# Patient Record
Sex: MALE | Race: BLACK OR AFRICAN AMERICAN | NOT HISPANIC OR LATINO
[De-identification: names, ages, dates, MRNs, and addresses within clinical notes are randomized per-mention and may not be internally consistent; named-entity substitution may affect disease eponyms.]

---

## 2019-05-31 ENCOUNTER — APPOINTMENT (OUTPATIENT)
Dept: PULMONOLOGY | Facility: CLINIC | Age: 69
End: 2019-05-31
Payer: MEDICARE

## 2019-05-31 VITALS
BODY MASS INDEX: 30.66 KG/M2 | HEIGHT: 69 IN | HEART RATE: 61 BPM | TEMPERATURE: 98 F | WEIGHT: 207 LBS | DIASTOLIC BLOOD PRESSURE: 103 MMHG | SYSTOLIC BLOOD PRESSURE: 174 MMHG | OXYGEN SATURATION: 95 %

## 2019-05-31 DIAGNOSIS — I10 ESSENTIAL (PRIMARY) HYPERTENSION: ICD-10-CM

## 2019-05-31 DIAGNOSIS — Z86.711 PERSONAL HISTORY OF PULMONARY EMBOLISM: ICD-10-CM

## 2019-05-31 DIAGNOSIS — G45.9 TRANSIENT CEREBRAL ISCHEMIC ATTACK, UNSPECIFIED: ICD-10-CM

## 2019-05-31 PROCEDURE — 99204 OFFICE O/P NEW MOD 45 MIN: CPT

## 2019-05-31 NOTE — PHYSICAL EXAM
[General Appearance - Well Developed] : well developed [Normal Appearance] : normal appearance [Well Groomed] : well groomed [General Appearance - Well Nourished] : well nourished [No Deformities] : no deformities [General Appearance - In No Acute Distress] : no acute distress [Normal Conjunctiva] : the conjunctiva exhibited no abnormalities [Eyelids - No Xanthelasma] : the eyelids demonstrated no xanthelasmas [Low Lying Soft Palate] : low lying soft palate [III] : III [Heart Rate And Rhythm] : heart rate was normal and rhythm regular [Heart Sounds] : normal S1 and S2 [Heart Sounds Gallop] : no gallops [Murmurs] : no murmurs [Heart Sounds Pericardial Friction Rub] : no pericardial rub [Auscultation Breath Sounds / Voice Sounds] : lungs were clear to auscultation bilaterally [Abnormal Walk] : normal gait [Musculoskeletal - Swelling] : no joint swelling seen [Motor Tone] : muscle strength and tone were normal [Nail Clubbing] : no clubbing of the fingernails [Cyanosis, Localized] : no localized cyanosis [Petechial Hemorrhages (___cm)] : no petechial hemorrhages [] : no ischemic changes [FreeTextEntry2] : 2+ R LE edema [Oriented To Time, Place, And Person] : oriented to person, place, and time [Impaired Insight] : insight and judgment were intact [Affect] : the affect was normal [FreeTextEntry1] : cooperative, memory poor

## 2019-05-31 NOTE — ASSESSMENT
[FreeTextEntry1] : snoring, witnessed apnea, hx TIAs and stroke, memory impairment\par \par Discussed w pt and wife- obstructive sleep apnea very likely, and treatment may improve cognitive function and reduce stroke and cardiovascular risks.\par \par Treatment options for sleep disordered breathing were discussed.  The most rapid and successful treatment remains nasal CPAP or BilevelPAP.  Alternatives include upper airway surgery such as uvulopharyngoplasty or a dental appliance (better for milder cases).  Recently hypoglossal nerve stimulation has been used.  Positional therapy (avoidance of supine posture) can be helpful, and all patients should try to maintain a healthy weight and avoid alcohol or other sedating medications close to bedtime \par \par Because of a high likelihood of obstructive sleep apnea, split-night polysomnography will be ordered. If the first few hours of the diagnostic recording confirm the clinical suspicion of severe sleep apnea, CPAP treatment  will be started on the remainder of the night and titrated to an optimal pressure. If successful, CPAP will be ordered after the study.  If severe sleep apnea is not seen, the study will continue as a diagnostic study.

## 2019-05-31 NOTE — HISTORY OF PRESENT ILLNESS
[FreeTextEntry1] : 05/31/2019 :  STACY KIRKPATRICK is a 68 year male who is here for possible obstructive sleep apnea.  He is having increasing problems with memory difficulties, according to his wife, who also reports long hx of severe snoring, witnessed apnea and daytime sleepiness.  He has AM headache most days.  He has gained 20 lbs past 2 yrs. \par \par Also has hx of TIAs and told he has had "mini strokes".  Also past hx of R LE DVT and pulmonary embolism.

## 2019-05-31 NOTE — REVIEW OF SYSTEMS
[Snoring] : snoring [Witnessed Apneas] : witnessed apnea [A.M. Dry Mouth] : a.m. dry mouth [A.M. Headache] : headache present upon awakening [Negative] : Psychiatric

## 2019-06-05 ENCOUNTER — OTHER (OUTPATIENT)
Age: 69
End: 2019-06-05

## 2019-06-19 ENCOUNTER — OTHER (OUTPATIENT)
Age: 69
End: 2019-06-19

## 2019-06-21 ENCOUNTER — OUTPATIENT (OUTPATIENT)
Dept: OUTPATIENT SERVICES | Facility: HOSPITAL | Age: 69
LOS: 1 days | End: 2019-06-21
Payer: COMMERCIAL

## 2019-06-21 ENCOUNTER — APPOINTMENT (OUTPATIENT)
Dept: SLEEP CENTER | Facility: HOSPITAL | Age: 69
End: 2019-06-21

## 2019-06-21 DIAGNOSIS — G47.33 OBSTRUCTIVE SLEEP APNEA (ADULT) (PEDIATRIC): ICD-10-CM

## 2019-06-21 PROCEDURE — 95810 POLYSOM 6/> YRS 4/> PARAM: CPT

## 2019-06-21 PROCEDURE — 95810 POLYSOM 6/> YRS 4/> PARAM: CPT | Mod: 26

## 2019-06-27 ENCOUNTER — OTHER (OUTPATIENT)
Age: 69
End: 2019-06-27

## 2019-06-27 ENCOUNTER — APPOINTMENT (OUTPATIENT)
Dept: PULMONOLOGY | Facility: CLINIC | Age: 69
End: 2019-06-27
Payer: MEDICARE

## 2019-06-27 VITALS
HEIGHT: 69 IN | WEIGHT: 207 LBS | BODY MASS INDEX: 30.66 KG/M2 | SYSTOLIC BLOOD PRESSURE: 183 MMHG | OXYGEN SATURATION: 95 % | HEART RATE: 72 BPM | TEMPERATURE: 97.7 F | DIASTOLIC BLOOD PRESSURE: 126 MMHG

## 2019-06-27 PROCEDURE — 99214 OFFICE O/P EST MOD 30 MIN: CPT

## 2019-06-27 RX ORDER — RIVAROXABAN 20 MG/1
20 TABLET, FILM COATED ORAL
Refills: 0 | Status: ACTIVE | COMMUNITY

## 2019-06-27 NOTE — PHYSICAL EXAM
[Normal Appearance] : normal appearance [General Appearance - Well Developed] : well developed [General Appearance - Well Nourished] : well nourished [Well Groomed] : well groomed [No Deformities] : no deformities [General Appearance - In No Acute Distress] : no acute distress [Low Lying Soft Palate] : low lying soft palate [III] : III [FreeTextEntry1] : awake, alert, cooperative, insight and memory poor

## 2019-06-27 NOTE — HISTORY OF PRESENT ILLNESS
[FreeTextEntry1] : 05/31/2019 :  STACY KIRKPATRICK is a 68 year male who is here for possible obstructive sleep apnea.  He is having increasing problems with memory difficulties, according to his wife, who also reports long hx of severe snoring, witnessed apnea and daytime sleepiness.  He has AM headache most days.  He has gained 20 lbs past 2 yrs. \par \par Also has hx of TIAs and told he has had "mini strokes".  Also past hx of R LE DVT and pulmonary embolism. \par \par 6/27/19:  here after overnight polysomnography 6/21/19.  Reviewed w pt and wife.  Moderate obstructive sleep apnea with Apnea Hypopnea Index 26.  There have been no significant medical events and no new medications since the patient was last seen.

## 2019-06-27 NOTE — DISCUSSION/SUMMARY
[FreeTextEntry1] : obstructive sleep apnea, moderate\par \par Treatment options for sleep disordered breathing were discussed.  The most rapid and successful treatment remains nasal CPAP or BilevelPAP.  Alternatives include upper airway surgery such as uvulopharyngoplasty or a dental appliance (better for milder cases).  Recently hypoglossal nerve stimulation has been used.  Positional therapy (avoidance of supine posture) can be helpful, and all patients should try to maintain a healthy weight and avoid alcohol or other sedating medications close to bedtime \par \par Patient will have overnight polysomnography with CPAP/Bilevel PAP titration.  If successful and comfortable, treatment will be ordered from a durable medical equipment provider shortly after the study.  Follow up about one month after  testing.

## 2019-07-02 ENCOUNTER — OTHER (OUTPATIENT)
Age: 69
End: 2019-07-02

## 2019-07-04 ENCOUNTER — FORM ENCOUNTER (OUTPATIENT)
Age: 69
End: 2019-07-04

## 2019-07-18 ENCOUNTER — OTHER (OUTPATIENT)
Age: 69
End: 2019-07-18

## 2019-07-25 ENCOUNTER — OUTPATIENT (OUTPATIENT)
Dept: OUTPATIENT SERVICES | Facility: HOSPITAL | Age: 69
LOS: 1 days | End: 2019-07-25
Payer: MEDICARE

## 2019-07-25 ENCOUNTER — APPOINTMENT (OUTPATIENT)
Dept: SLEEP CENTER | Facility: HOSPITAL | Age: 69
End: 2019-07-25

## 2019-07-25 DIAGNOSIS — G47.33 OBSTRUCTIVE SLEEP APNEA (ADULT) (PEDIATRIC): ICD-10-CM

## 2019-07-25 PROCEDURE — 95811 POLYSOM 6/>YRS CPAP 4/> PARM: CPT | Mod: 26

## 2019-07-25 PROCEDURE — 95811 POLYSOM 6/>YRS CPAP 4/> PARM: CPT

## 2019-07-30 ENCOUNTER — OTHER (OUTPATIENT)
Age: 69
End: 2019-07-30

## 2019-08-08 ENCOUNTER — APPOINTMENT (OUTPATIENT)
Dept: PULMONOLOGY | Facility: CLINIC | Age: 69
End: 2019-08-08
Payer: MEDICARE

## 2019-08-08 VITALS
TEMPERATURE: 98.9 F | OXYGEN SATURATION: 95 % | HEIGHT: 69 IN | SYSTOLIC BLOOD PRESSURE: 117 MMHG | HEART RATE: 64 BPM | DIASTOLIC BLOOD PRESSURE: 73 MMHG

## 2019-08-08 PROCEDURE — 99213 OFFICE O/P EST LOW 20 MIN: CPT

## 2019-09-19 ENCOUNTER — APPOINTMENT (OUTPATIENT)
Dept: PULMONOLOGY | Facility: CLINIC | Age: 69
End: 2019-09-19
Payer: MEDICARE

## 2019-09-19 VITALS
TEMPERATURE: 98.4 F | HEART RATE: 69 BPM | SYSTOLIC BLOOD PRESSURE: 167 MMHG | OXYGEN SATURATION: 94 % | DIASTOLIC BLOOD PRESSURE: 94 MMHG

## 2019-09-19 PROCEDURE — 99214 OFFICE O/P EST MOD 30 MIN: CPT

## 2019-09-20 NOTE — HISTORY OF PRESENT ILLNESS
[FreeTextEntry1] : 05/31/2019 :  STACY KIRKPATRICK is a 68 year male who is here for possible obstructive sleep apnea.  He is having increasing problems with memory difficulties, according to his wife, who also reports long hx of severe snoring, witnessed apnea and daytime sleepiness.  He has AM headache most days.  He has gained 20 lbs past 2 yrs. \par \par Also has hx of TIAs and told he has had "mini strokes".  Also past hx of R LE DVT and pulmonary embolism. \par \par 6/27/19:  here after overnight polysomnography 6/21/19.  Reviewed w pt and wife.  Moderate obstructive sleep apnea with Apnea Hypopnea Index 26.  There have been no significant medical events and no new medications since the patient was last seen. \par \par 8/8/19: CPAP titration done 7/25/19 reviewed w pt and wife. Slept better than usual. CPAP at 5 eliminated all events.  Ordered, not contacted by durable medical equipment provider yet ([employer]).  There have been no significant medical events and no new medications since the patient was last seen. \par \par 9/19/19:  on cpap about 3 weeks- fewer nocturnal wakes, more alert.  Bed 10, up 630 am, 2-3 wakes (chiara).  No download from durable medical equipment provider yet ([employer])

## 2019-09-20 NOTE — ASSESSMENT
[FreeTextEntry1] : Subjectively better with CPAP, need to get download data.  Ask durable medical equipment provider to provide.  F/U few months.  The patient is advised that in addition to worsening sleep leading to daytime sleepiness, sleep apnea may be associated with worsening hypertension and may be a risk factor for cardiovascular disease and stroke.

## 2019-09-20 NOTE — PHYSICAL EXAM
[General Appearance - Well Developed] : well developed [Normal Appearance] : normal appearance [Well Groomed] : well groomed [General Appearance - Well Nourished] : well nourished [No Deformities] : no deformities [Low Lying Soft Palate] : low lying soft palate [General Appearance - In No Acute Distress] : no acute distress [Heart Rate And Rhythm] : heart rate was normal and rhythm regular [Heart Sounds] : normal S1 and S2 [III] : III [Heart Sounds Gallop] : no gallops [Heart Sounds Pericardial Friction Rub] : no pericardial rub [Murmurs] : no murmurs [Auscultation Breath Sounds / Voice Sounds] : lungs were clear to auscultation bilaterally [Nail Clubbing] : no clubbing of the fingernails [Cyanosis, Localized] : no localized cyanosis [] : no ischemic changes [Petechial Hemorrhages (___cm)] : no petechial hemorrhages [FreeTextEntry2] : 2+ R LE edema

## 2019-09-20 NOTE — PHYSICAL EXAM
[Abnormal Walk] : normal gait [Musculoskeletal - Swelling] : no joint swelling seen [Motor Tone] : muscle strength and tone were normal [General Appearance - Well Developed] : well developed [Normal Appearance] : normal appearance [Well Groomed] : well groomed [General Appearance - Well Nourished] : well nourished [No Deformities] : no deformities [General Appearance - In No Acute Distress] : no acute distress [Low Lying Soft Palate] : low lying soft palate [III] : III [Heart Rate And Rhythm] : heart rate was normal and rhythm regular [Heart Sounds] : normal S1 and S2 [Heart Sounds Gallop] : no gallops [Murmurs] : no murmurs [Heart Sounds Pericardial Friction Rub] : no pericardial rub [Auscultation Breath Sounds / Voice Sounds] : lungs were clear to auscultation bilaterally [Cyanosis, Localized] : no localized cyanosis [Nail Clubbing] : no clubbing of the fingernails [Petechial Hemorrhages (___cm)] : no petechial hemorrhages [] : no ischemic changes [FreeTextEntry2] : 2+ R LE edema

## 2019-09-20 NOTE — ASSESSMENT
[FreeTextEntry1] : Subjectively better on CPAP.  Given long term advice about CPAP use.  Call durable medical equipment provider provider if any equipment problems.  Replace interface as per schedule, generally at least every 3 months.  Stressed importance of CPAP compliance.  Return to see me in about 6 months if doing well. Need to get download from durable medical equipment provider ([employer])

## 2019-09-20 NOTE — HISTORY OF PRESENT ILLNESS
[FreeTextEntry1] : 05/31/2019 :  STACY KIRKPATRICK is a 68 year male who is here for possible obstructive sleep apnea.  He is having increasing problems with memory difficulties, according to his wife, who also reports long hx of severe snoring, witnessed apnea and daytime sleepiness.  He has AM headache most days.  He has gained 20 lbs past 2 yrs. \par \par Also has hx of TIAs and told he has had "mini strokes".  Also past hx of R LE DVT and pulmonary embolism. \par \par 6/27/19:  here after overnight polysomnography 6/21/19.  Reviewed w pt and wife.  Moderate obstructive sleep apnea with Apnea Hypopnea Index 26.  There have been no significant medical events and no new medications since the patient was last seen. \par \par 8/8/19: CPAP titration done 7/25/19 reviewed w pt and wife. Slept better than usual. CPAP at 5 eliminated all events.  Ordered, not contacted by durable medical equipment provider yet ([employer]).  There have been no significant medical events and no new medications since the patient was last seen. \par \par 9/19/19:  on cpap about 3 weeks- fewer nocturnal wakes, more alert.  Bed 10, up 630 am, 2-3 wakes (chiara).  No download from durable medical equipment provider yet ([employer])

## 2019-12-20 ENCOUNTER — APPOINTMENT (OUTPATIENT)
Dept: UROLOGY | Facility: CLINIC | Age: 69
End: 2019-12-20

## 2020-01-09 ENCOUNTER — APPOINTMENT (OUTPATIENT)
Dept: PULMONOLOGY | Facility: CLINIC | Age: 70
End: 2020-01-09
Payer: MEDICARE

## 2020-01-09 VITALS
DIASTOLIC BLOOD PRESSURE: 79 MMHG | BODY MASS INDEX: 29.47 KG/M2 | TEMPERATURE: 98.6 F | HEIGHT: 69 IN | HEART RATE: 63 BPM | SYSTOLIC BLOOD PRESSURE: 149 MMHG | OXYGEN SATURATION: 95 % | WEIGHT: 199 LBS

## 2020-01-09 PROCEDURE — 99214 OFFICE O/P EST MOD 30 MIN: CPT

## 2020-01-09 NOTE — PHYSICAL EXAM
[General Appearance - Well Developed] : well developed [Normal Appearance] : normal appearance [Well Groomed] : well groomed [General Appearance - Well Nourished] : well nourished [No Deformities] : no deformities [General Appearance - In No Acute Distress] : no acute distress [Low Lying Soft Palate] : low lying soft palate [III] : III [Respiration, Rhythm And Depth] : normal respiratory rhythm and effort [Exaggerated Use Of Accessory Muscles For Inspiration] : no accessory muscle use [Auscultation Breath Sounds / Voice Sounds] : lungs were clear to auscultation bilaterally [Abnormal Walk] : normal gait [Gait - Sufficient For Exercise Testing] : the gait was sufficient for exercise testing [Petechial Hemorrhages (___cm)] : no petechial hemorrhages [Cyanosis, Localized] : no localized cyanosis [Nail Clubbing] : no clubbing of the fingernails [Affect] : the affect was normal [Mood] : the mood was normal [] : no ischemic changes [FreeTextEntry1] : cooperative, memory poor

## 2020-01-09 NOTE — DISCUSSION/SUMMARY
[FreeTextEntry1] : obstructive sleep apnea on CPAP\par memory difficulty\par \par Questionable compliance on CPAP - need to get download and new mask.  Showed him Dreamwear under nose mask- this he could tolerate this.\par \par The patient is advised that in addition to worsening sleep leading to daytime sleepiness, sleep apnea may be associated with worsening hypertension and may be a risk factor for cardiovascular disease and stroke. \par \par F/U 4 months

## 2020-01-09 NOTE — HISTORY OF PRESENT ILLNESS
[FreeTextEntry1] : 05/31/2019 :  STACY KIRKPATRICK is a 68 year male who is here for possible obstructive sleep apnea.  He is having increasing problems with memory difficulties, according to his wife, who also reports long hx of severe snoring, witnessed apnea and daytime sleepiness.  He has AM headache most days.  He has gained 20 lbs past 2 yrs. \par \par Also has hx of TIAs and told he has had "mini strokes".  Also past hx of R LE DVT and pulmonary embolism. \par \par 6/27/19:  here after overnight polysomnography 6/21/19.  Reviewed w pt and wife.  Moderate obstructive sleep apnea with Apnea Hypopnea Index 26.  There have been no significant medical events and no new medications since the patient was last seen. \par \par 8/8/19: CPAP titration done 7/25/19 reviewed w pt and wife. Slept better than usual. CPAP at 5 eliminated all events.  Ordered, not contacted by durable medical equipment provider yet ([employer]).  There have been no significant medical events and no new medications since the patient was last seen. \par \par 9/19/19:  on cpap about 3 weeks- fewer nocturnal wakes, more alert.  Bed 10, up 630 am, 2-3 wakes (better).  No download from durable medical equipment provider yet ([employer])\par \par 1/9/20:  No download available- his wife states using about 50% of nights, sleep fragmented, up to urinate 5x/nt.  Memory problems probably worse. Uncomfortable with his mask.\par \par There have been no significant medical events and no new medications since the patient was last seen.

## 2020-01-24 ENCOUNTER — TRANSCRIPTION ENCOUNTER (OUTPATIENT)
Age: 70
End: 2020-01-24

## 2020-04-09 ENCOUNTER — APPOINTMENT (OUTPATIENT)
Dept: PULMONOLOGY | Facility: CLINIC | Age: 70
End: 2020-04-09

## 2020-07-22 ENCOUNTER — TRANSCRIPTION ENCOUNTER (OUTPATIENT)
Age: 70
End: 2020-07-22

## 2020-10-29 ENCOUNTER — APPOINTMENT (OUTPATIENT)
Dept: PULMONOLOGY | Facility: CLINIC | Age: 70
End: 2020-10-29
Payer: MEDICARE

## 2020-10-29 VITALS
WEIGHT: 181 LBS | HEIGHT: 69 IN | OXYGEN SATURATION: 98 % | HEART RATE: 72 BPM | BODY MASS INDEX: 26.81 KG/M2 | TEMPERATURE: 98.6 F | DIASTOLIC BLOOD PRESSURE: 93 MMHG | SYSTOLIC BLOOD PRESSURE: 172 MMHG

## 2020-10-29 DIAGNOSIS — G47.33 OBSTRUCTIVE SLEEP APNEA (ADULT) (PEDIATRIC): ICD-10-CM

## 2020-10-29 DIAGNOSIS — R53.82 CHRONIC FATIGUE, UNSPECIFIED: ICD-10-CM

## 2020-10-29 PROCEDURE — 99214 OFFICE O/P EST MOD 30 MIN: CPT | Mod: 25

## 2020-10-29 PROCEDURE — 99072 ADDL SUPL MATRL&STAF TM PHE: CPT

## 2020-10-29 NOTE — ASSESSMENT
[FreeTextEntry1] : obstructive sleep apnea may have improved/resolved w substantial weight loss.  CPAP was effective but uncomfortable.  Sleep hygiene poor, long time in bed, naps during day as well. Will reassess ANDREA with unattended home sleep testing before deciding on resuming rx with CPAP.  Discussed with patient and wife\par \par

## 2020-10-29 NOTE — HISTORY OF PRESENT ILLNESS
[FreeTextEntry1] : 05/31/2019 :  STACY KIRKPATRICK is a 68 year male who is here for possible obstructive sleep apnea.  He is having increasing problems with memory difficulties, according to his wife, who also reports long hx of severe snoring, witnessed apnea and daytime sleepiness.  He has AM headache most days.  He has gained 20 lbs past 2 yrs. \par \par Also has hx of TIAs and told he has had "mini strokes".  Also past hx of R LE DVT and pulmonary embolism. \par \par 6/27/19:  here after overnight polysomnography 6/21/19.  Reviewed w pt and wife.  Moderate obstructive sleep apnea with Apnea Hypopnea Index 26.  There have been no significant medical events and no new medications since the patient was last seen. \par \par 8/8/19: CPAP titration done 7/25/19 reviewed w pt and wife. Slept better than usual. CPAP at 5 eliminated all events.  Ordered, not contacted by durable medical equipment provider yet ([email or web address]).  There have been no significant medical events and no new medications since the patient was last seen. \par \par 9/19/19:  on cpap about 3 weeks- fewer nocturnal wakes, more alert.  Bed 10, up 630 am, 2-3 wakes (better).  No download from durable medical equipment provider yet ([email or web address])\par \par 1/9/20:  No download available- his wife states using about 50% of nights, sleep fragmented, up to urinate 5x/nt.  Memory problems probably worse. Uncomfortable with his mask.\par \par 10/29/2020: Here w wife.  Unable to get new mask after last visit.  Continuing to lose weight; memory problems about same.  Downloaded CPAP today: last usage in January, Apnea Hypopnea Index 1.6 on rx, on CPAP @ 5.  Has now lost about 26 lbs since sleep apnea dx.  Off CPAP wife reports mild snoring. Bed at 8-9 pm, TV in bed, out of bed 6-8 am after many awakenings.  Sleepy during day.

## 2020-10-29 NOTE — PHYSICAL EXAM
[General Appearance - Well Developed] : well developed [Normal Appearance] : normal appearance [Well Groomed] : well groomed [General Appearance - Well Nourished] : well nourished [No Deformities] : no deformities [General Appearance - In No Acute Distress] : no acute distress [Low Lying Soft Palate] : low lying soft palate [III] : III [Heart Rate And Rhythm] : heart rate was normal and rhythm regular [Heart Sounds] : normal S1 and S2 [Heart Sounds Gallop] : no gallops [Murmurs] : no murmurs [Heart Sounds Pericardial Friction Rub] : no pericardial rub [Auscultation Breath Sounds / Voice Sounds] : lungs were clear to auscultation bilaterally [Abnormal Walk] : normal gait [Musculoskeletal - Swelling] : no joint swelling seen [Motor Tone] : muscle strength and tone were normal [Nail Clubbing] : no clubbing of the fingernails [Cyanosis, Localized] : no localized cyanosis [Petechial Hemorrhages (___cm)] : no petechial hemorrhages [] : no ischemic changes

## 2020-11-05 ENCOUNTER — APPOINTMENT (OUTPATIENT)
Dept: SLEEP CENTER | Facility: HOME HEALTH | Age: 70
End: 2020-11-05

## 2021-08-03 ENCOUNTER — APPOINTMENT (OUTPATIENT)
Dept: UROLOGY | Facility: CLINIC | Age: 71
End: 2021-08-03
Payer: MEDICARE

## 2021-08-03 VITALS
HEIGHT: 68 IN | TEMPERATURE: 98 F | BODY MASS INDEX: 29.86 KG/M2 | WEIGHT: 197 LBS | HEART RATE: 83 BPM | DIASTOLIC BLOOD PRESSURE: 88 MMHG | SYSTOLIC BLOOD PRESSURE: 167 MMHG

## 2021-08-03 PROCEDURE — 51798 US URINE CAPACITY MEASURE: CPT

## 2021-08-03 PROCEDURE — 99204 OFFICE O/P NEW MOD 45 MIN: CPT

## 2021-08-03 NOTE — HISTORY OF PRESENT ILLNESS
[Urinary Urgency] : urinary urgency [Urinary Frequency] : urinary frequency [Nocturia] : nocturia [None] : None [FreeTextEntry1] : This is a 70 year old male with hx HTN, HDL, DM2  Referred by Dr Clayton Mcdaniel\par Patient reports urinary urgency, frequency and nocturia X10. - bothersome\par On Tamsulosin 0.8 mg for a month prescrubed by Dr Mcdaniel\par Good FOS \par Complaints of 6 month left  testicular pain, more when sitting down. intermiitent. \par Dysuria, hematuria, flank pain\par \par Social hx: denies smoking or alcohol\par FHx: denies any cancer [Urinary Incontinence] : no urinary incontinence [Urinary Retention] : no urinary retention [Straining] : no straining [Weak Stream] : no weak stream [Intermittency] : no intermittency [Post-Void Dribbling] : no post-void dribbling [Dysuria] : no dysuria [Hematuria - Gross] : no gross hematuria [Hematuria - Microscopic] : no microscopic hematuria [Bladder Spasm] : no bladder spasm [Abdominal Pain] : no abdominal pain [Flank Pain] : no flank pain [Fever] : no fever [Fatigue] : no fatigue [Nausea] : no nausea [Anorexia] : no anorexia [Incisional Drainage] : no incisional drainage [Incisional Pain] : no incisional pain

## 2021-08-03 NOTE — PHYSICAL EXAM
[General Appearance - Well Developed] : well developed [General Appearance - Well Nourished] : well nourished [Normal Appearance] : normal appearance [Well Groomed] : well groomed [General Appearance - In No Acute Distress] : no acute distress [Edema] : no peripheral edema [Respiration, Rhythm And Depth] : normal respiratory rhythm and effort [Exaggerated Use Of Accessory Muscles For Inspiration] : no accessory muscle use [Abdomen Soft] : soft [Abdomen Tenderness] : non-tender [Costovertebral Angle Tenderness] : no ~M costovertebral angle tenderness [Urethral Meatus] : meatus normal [Urinary Bladder Findings] : the bladder was normal on palpation [Scrotum] : the scrotum was normal [Testes Mass (___cm)] : there were no testicular masses [No Prostate Nodules] : no prostate nodules [Normal Station and Gait] : the gait and station were normal for the patient's age [] : no rash [No Focal Deficits] : no focal deficits [Oriented To Time, Place, And Person] : oriented to person, place, and time [Affect] : the affect was normal [Mood] : the mood was normal [Not Anxious] : not anxious [No Palpable Adenopathy] : no palpable adenopathy [FreeTextEntry1] : prostate smooth

## 2021-08-03 NOTE — ASSESSMENT
[FreeTextEntry1] : BPH\par benign exam\par Discussed causes and other treatment options, rezum and Urolift\par patient  not interested in procedural options for now but wants reading materials. \par continue Tamsulosin 0. 8 mg at bedtime \par patient has memory loss, wife is  consistent in giving medication \par reviewed medication use and s/e\par trial myrbetriq\par \par Labs today: \par PSA UA UCx\par PVR 2 ml - r/o urinary retention\par \par Left testicular Pain\par Warm sitz bath\par NSAID prn \par normal exam \par US Scotum -requesting result form another facility \par \par follow up in one month

## 2021-08-04 LAB
PSA FREE FLD-MCNC: 14 %
PSA FREE SERPL-MCNC: 0.15 NG/ML
PSA SERPL-MCNC: 1.14 NG/ML

## 2021-08-09 ENCOUNTER — NON-APPOINTMENT (OUTPATIENT)
Age: 71
End: 2021-08-09

## 2021-08-09 LAB
APPEARANCE: CLEAR
BACTERIA UR CULT: NORMAL
BACTERIA: NEGATIVE
BILIRUBIN URINE: NEGATIVE
BLOOD URINE: NEGATIVE
COLOR: NORMAL
GLUCOSE QUALITATIVE U: NEGATIVE
HYALINE CASTS: 0 /LPF
KETONES URINE: NEGATIVE
LEUKOCYTE ESTERASE URINE: NEGATIVE
MICROSCOPIC-UA: NORMAL
NITRITE URINE: NEGATIVE
PH URINE: 7
PROTEIN URINE: NORMAL
RED BLOOD CELLS URINE: 2 /HPF
SPECIFIC GRAVITY URINE: 1.02
SQUAMOUS EPITHELIAL CELLS: 1 /HPF
UROBILINOGEN URINE: NORMAL
WHITE BLOOD CELLS URINE: 1 /HPF

## 2021-08-31 ENCOUNTER — APPOINTMENT (OUTPATIENT)
Dept: NEUROLOGY | Facility: CLINIC | Age: 71
End: 2021-08-31
Payer: MEDICARE

## 2021-08-31 VITALS
RESPIRATION RATE: 14 BRPM | DIASTOLIC BLOOD PRESSURE: 89 MMHG | SYSTOLIC BLOOD PRESSURE: 155 MMHG | HEART RATE: 63 BPM | WEIGHT: 199 LBS | BODY MASS INDEX: 29.47 KG/M2 | TEMPERATURE: 97.9 F | HEIGHT: 69 IN | OXYGEN SATURATION: 94 %

## 2021-08-31 DIAGNOSIS — R41.3 OTHER AMNESIA: ICD-10-CM

## 2021-08-31 PROCEDURE — 99204 OFFICE O/P NEW MOD 45 MIN: CPT

## 2021-08-31 RX ORDER — ADHESIVE TAPE 3"X 2.3 YD
50 MCG TAPE, NON-MEDICATED TOPICAL
Refills: 0 | Status: ACTIVE | COMMUNITY

## 2021-08-31 RX ORDER — ROSUVASTATIN CALCIUM 10 MG/1
10 TABLET, FILM COATED ORAL
Refills: 0 | Status: DISCONTINUED | COMMUNITY
End: 2021-08-31

## 2021-08-31 RX ORDER — TRIAMCINOLONE ACETONIDE 0.5 %
0.5 CREAM (GRAM) TOPICAL
Refills: 0 | Status: ACTIVE | COMMUNITY

## 2021-08-31 RX ORDER — CYCLOBENZAPRINE HCL 5 MG
5 TABLET ORAL
Refills: 0 | Status: ACTIVE | COMMUNITY

## 2021-08-31 RX ORDER — LISINOPRIL 20 MG/1
20 TABLET ORAL
Refills: 0 | Status: DISCONTINUED | COMMUNITY
End: 2021-08-31

## 2021-08-31 RX ORDER — TAMSULOSIN HYDROCHLORIDE 0.4 MG/1
0.4 CAPSULE ORAL
Refills: 0 | Status: ACTIVE | COMMUNITY

## 2021-08-31 RX ORDER — CARVEDILOL 3.12 MG/1
TABLET, FILM COATED ORAL
Refills: 0 | Status: DISCONTINUED | COMMUNITY
End: 2021-08-31

## 2021-08-31 RX ORDER — DICLOFENAC SODIUM 10 MG/G
1 GEL TOPICAL
Refills: 0 | Status: ACTIVE | COMMUNITY

## 2021-08-31 RX ORDER — ALLOPURINOL 300 MG/1
300 TABLET ORAL DAILY
Refills: 0 | Status: ACTIVE | COMMUNITY

## 2021-08-31 RX ORDER — VITAMIN B COMPLEX
CAPSULE ORAL
Refills: 0 | Status: ACTIVE | COMMUNITY

## 2021-08-31 RX ORDER — COLCHICINE 0.5 MG
0.5 TABLET ORAL
Refills: 0 | Status: ACTIVE | COMMUNITY

## 2021-08-31 NOTE — DATA REVIEWED
[de-identified] : MRI brain independently interpreted and reviewed with patient and wife, notable for diffuse atrophy (maximal in the anterior temporal lobes), chronic periventricular white matter disease, prior infarcts, microhemorrhages (mostly brainstem/cerebellum but some cortical as well)

## 2021-08-31 NOTE — CONSULT LETTER
[Dear  ___] : Dear  [unfilled], [Consult Letter:] : I had the pleasure of evaluating your patient, [unfilled]. [Please see my note below.] : Please see my note below. [Consult Closing:] : Thank you very much for allowing me to participate in the care of this patient.  If you have any questions, please do not hesitate to contact me. [Sincerely,] : Sincerely, [FreeTextEntry2] : Clayton Mcdaniel MD [FreeTextEntry3] : Radha Jiménez MD

## 2021-08-31 NOTE — ASSESSMENT
[FreeTextEntry1] : Dementia, most likely vascular based on history of strokes and significant white matter disease on MRI.\par Will check TSH, B12, RPR to exclude reversible causes.\par MRI also shows scattered microhemorrhages, suspect these are hypertensive as most are subcortical, but there are some lobar ones as well which raises concern for cerebral amyloid angiopathy and risk for lobar hemorrhage particularly as he is on therapeutic anticoagulation with Xarelto.  Will refer to hematology Dr. Squires for evaluation in order to determine whether he still needs AC (clot was 2 years ago, but apparently unprovoked and he has positive family history).\par Will also start donepezil 5 mg, risks/benefits/side effects reviewed with patient and wife.\par \par RTC 6 months

## 2021-08-31 NOTE — PHYSICAL EXAM
[FreeTextEntry1] : General: Alert and oriented to person, place, time, and situation. In no acute distress. Cooperative. Follows commands. \par Eyes: Sclera and conjunctiva were normal and pupils were equal in size, round, reactive to light, with normal accommodation\par ENT: Ears and nose normal in appearance. \par Vascular: No peripheral edema.\par Respiratory: No visible respiratory distress. \par Musculoskeletal: No involuntary movements were seen.\par Skin: Normal skin color and pigmentation.\par \par MS: Awake, alert, oriented to person, place (2/3), situation and time (1/3). Follows commands.  MOCA 15/30 (see attached).\par Language: Speech is clear, fluent. No dysarthria. \par CNs 2 - 12 intact. EOMI no nystagmus, no diplopia. No facial asymmetry b/l. Tongue midline, normal movements, no atrophy. \par Motor: Normal muscle bulk & tone. No noticeable tremor or seizure. No pronator drift. Muscle strength of b/l UE and b/l LE 5/5. \par Sensation: Intact to LT b/l throughout\par Cortical: No extinction\par Coordination: Intact rapid-alternating movements. No dysmetria to finger to nose. \par DTR: 2+ in biceps, brachioradialis and triceps; 2+ in patellar and ankle; plantars are down b/l\par Gait: No postural instability. Normal stance and tandem gait.\par \par \par

## 2021-08-31 NOTE — HISTORY OF PRESENT ILLNESS
[FreeTextEntry1] : 70-year-old man with diabetes, hypertension, prior strokes, DVT 2 years ago on Xarelto presenting with memory loss.\par \par His memory loss started about 5 years ago, when the family started to notice he is repeating stories over and over, and he repeat same questions.  Has gradually worsened since then.  He was seen by 2 neurologists, one of whom mentioned dementia. He was referred to sleep medicine doctor as he was told he has sleep apnea causing him the memory issue. He was put on diet and lost weight and his sleep improved but not the memory. He also has urine frequency which affect his sleep.  Will often go to the grocery store and forget what to buy, open a soda and forget he opened up (and sometimes get very irritable when family reminds him).  Lives with his wife.\par \par Last week he had an episode of slurred speech for couple hours then resolved. His previous strokes were as visual symptoms as a curtain on his eye.

## 2021-09-09 LAB
T PALLIDUM AB SER QL IA: NEGATIVE
TSH SERPL-ACNC: 1.7 UIU/ML
VIT B12 SERPL-MCNC: 649 PG/ML

## 2021-09-17 ENCOUNTER — APPOINTMENT (OUTPATIENT)
Dept: UROLOGY | Facility: CLINIC | Age: 71
End: 2021-09-17

## 2021-09-29 ENCOUNTER — LABORATORY RESULT (OUTPATIENT)
Age: 71
End: 2021-09-29

## 2021-09-29 ENCOUNTER — APPOINTMENT (OUTPATIENT)
Dept: HEMATOLOGY ONCOLOGY | Facility: CLINIC | Age: 71
End: 2021-09-29
Payer: MEDICARE

## 2021-09-29 VITALS
HEIGHT: 69 IN | TEMPERATURE: 97.5 F | HEART RATE: 76 BPM | WEIGHT: 200 LBS | OXYGEN SATURATION: 97 % | DIASTOLIC BLOOD PRESSURE: 88 MMHG | BODY MASS INDEX: 29.62 KG/M2 | SYSTOLIC BLOOD PRESSURE: 158 MMHG

## 2021-09-29 DIAGNOSIS — E11.9 TYPE 2 DIABETES MELLITUS W/OUT COMPLICATIONS: ICD-10-CM

## 2021-09-29 DIAGNOSIS — I82.409 ACUTE EMBOLISM AND THROMBOSIS OF UNSPECIFIED DEEP VEINS OF UNSPECIFIED LOWER EXTREMITY: ICD-10-CM

## 2021-09-29 PROCEDURE — 99204 OFFICE O/P NEW MOD 45 MIN: CPT | Mod: 25

## 2021-09-29 PROCEDURE — 36415 COLL VENOUS BLD VENIPUNCTURE: CPT

## 2021-10-04 LAB
25(OH)D3 SERPL-MCNC: 25.4 NG/ML
ALBUMIN MFR SERPL ELPH: 57 %
ALBUMIN SERPL ELPH-MCNC: 4.3 G/DL
ALBUMIN SERPL-MCNC: 4 G/DL
ALBUMIN/GLOB SERPL: 1.3 RATIO
ALP BLD-CCNC: 65 U/L
ALPHA1 GLOB MFR SERPL ELPH: 4.1 %
ALPHA1 GLOB SERPL ELPH-MCNC: 0.3 G/DL
ALPHA2 GLOB MFR SERPL ELPH: 11.8 %
ALPHA2 GLOB SERPL ELPH-MCNC: 0.8 G/DL
ALT SERPL-CCNC: 18 U/L
ANION GAP SERPL CALC-SCNC: 12 MMOL/L
AST SERPL-CCNC: 24 U/L
B-GLOBULIN MFR SERPL ELPH: 11.6 %
B-GLOBULIN SERPL ELPH-MCNC: 0.8 G/DL
B2 GLYCOPROT1 IGA SERPL IA-ACNC: <5 SAU
B2 GLYCOPROT1 IGG SER-ACNC: <5 SGU
B2 GLYCOPROT1 IGM SER-ACNC: <5 SMU
BASOPHILS # BLD AUTO: 0.02 K/UL
BASOPHILS NFR BLD AUTO: 0.4 %
BILIRUB SERPL-MCNC: 0.3 MG/DL
BUN SERPL-MCNC: 12 MG/DL
CALCIUM SERPL-MCNC: 9.9 MG/DL
CARDIOLIPIN AB SER IA-ACNC: NEGATIVE
CHLORIDE SERPL-SCNC: 106 MMOL/L
CO2 SERPL-SCNC: 28 MMOL/L
CONFIRM: 48.2 SEC
COVID-19 SPIKE DOMAIN ANTIBODY INTERPRETATION: NEGATIVE
CREAT SERPL-MCNC: 1.09 MG/DL
DEPRECATED KAPPA LC FREE/LAMBDA SER: 1.33 RATIO
DRVVT IMM 1:2 NP PPP: ABNORMAL
DRVVT SCREEN TO CONFIRM RATIO: 1.38 RATIO
EOSINOPHIL # BLD AUTO: 0.06 K/UL
EOSINOPHIL NFR BLD AUTO: 1.3 %
ERYTHROCYTE [SEDIMENTATION RATE] IN BLOOD BY WESTERGREN METHOD: 59 MM/HR
ESTIMATED AVERAGE GLUCOSE: 123 MG/DL
FACT VIII ACT/NOR PPP: 134 %
FOLATE SERPL-MCNC: 10.5 NG/ML
GAMMA GLOB FLD ELPH-MCNC: 1.1 G/DL
GAMMA GLOB MFR SERPL ELPH: 15.5 %
GLUCOSE SERPL-MCNC: 106 MG/DL
HBA1C MFR BLD HPLC: 5.9 %
HCT VFR BLD CALC: 41.6 %
HGB BLD-MCNC: 12.9 G/DL
IGA SER QL IEP: 312 MG/DL
IGG SER QL IEP: 1150 MG/DL
IGM SER QL IEP: 53 MG/DL
IMM GRANULOCYTES NFR BLD AUTO: 0.2 %
INTERPRETATION SERPL IEP-IMP: NORMAL
KAPPA LC CSF-MCNC: 2.3 MG/DL
KAPPA LC SERPL-MCNC: 3.06 MG/DL
LDH SERPL-CCNC: 222 U/L
LYMPHOCYTES # BLD AUTO: 1.64 K/UL
LYMPHOCYTES NFR BLD AUTO: 36 %
M PROTEIN SPEC IFE-MCNC: NORMAL
MAN DIFF?: NORMAL
MCHC RBC-ENTMCNC: 28.9 PG
MCHC RBC-ENTMCNC: 31 GM/DL
MCV RBC AUTO: 93.1 FL
MONOCYTES # BLD AUTO: 0.36 K/UL
MONOCYTES NFR BLD AUTO: 7.9 %
NEUTROPHILS # BLD AUTO: 2.47 K/UL
NEUTROPHILS NFR BLD AUTO: 54.2 %
PLATELET # BLD AUTO: 193 K/UL
POTASSIUM SERPL-SCNC: 3.8 MMOL/L
PROT SERPL-MCNC: 7.1 G/DL
RBC # BLD: 4.47 M/UL
RBC # FLD: 14.9 %
SARS-COV-2 AB SERPL IA-ACNC: 0.4 U/ML
SCREEN DRVVT: 88.7 SEC
SILICA CLOTTING TIME INTERPRETATION: NORMAL
SILICA CLOTTING TIME S/C: 0.61 RATIO
SODIUM SERPL-SCNC: 146 MMOL/L
WBC # FLD AUTO: 4.56 K/UL

## 2021-10-04 NOTE — CONSULT LETTER
[Dear  ___] : Dear  [unfilled], [Consult Letter:] : I had the pleasure of evaluating your patient, [unfilled]. [Please see my note below.] : Please see my note below. [Consult Closing:] : Thank you very much for allowing me to participate in the care of this patient.  If you have any questions, please do not hesitate to contact me. [Sincerely,] : Sincerely, [FreeTextEntry3] : Sydney Squires MD\par

## 2021-10-04 NOTE — ASSESSMENT
[FreeTextEntry1] : Discussed with patient and his wife the fact that hyper coagulable conditions are extremely rare in -American patients...\par \par Work-up so far revealed only low level of his anticoagulants which can be related to patient taking Xarelto...\par \par I see no hematological condition requiring continuation of anticoagulation.\par \par If anticoagulation with Xarelto is DC'd, patient should have a repeat lupus anticoagulant level done.\par \par Thanks

## 2021-10-04 NOTE — HISTORY OF PRESENT ILLNESS
[de-identified] : 70 years old -American male, placed on Xarelto for a DVT that happened 2 years ago, is here to assess the need for continued anticoagulation...\par He only had one episode of DVT... His 40-year-old daughter also had a DVT about a year ago.... No other family history of clotting...

## 2021-10-29 ENCOUNTER — APPOINTMENT (OUTPATIENT)
Dept: UROLOGY | Facility: CLINIC | Age: 71
End: 2021-10-29
Payer: MEDICARE

## 2021-10-29 VITALS — DIASTOLIC BLOOD PRESSURE: 91 MMHG | TEMPERATURE: 98.7 F | HEART RATE: 76 BPM | SYSTOLIC BLOOD PRESSURE: 181 MMHG

## 2021-10-29 DIAGNOSIS — N40.0 BENIGN PROSTATIC HYPERPLASIA WITHOUT LOWER URINARY TRACT SYMPMS: ICD-10-CM

## 2021-10-29 PROCEDURE — 99213 OFFICE O/P EST LOW 20 MIN: CPT

## 2021-10-29 RX ORDER — MIRABEGRON 50 MG/1
50 TABLET, FILM COATED, EXTENDED RELEASE ORAL
Qty: 30 | Refills: 11 | Status: ACTIVE | COMMUNITY
Start: 2021-08-03 | End: 1900-01-01

## 2021-10-29 NOTE — HISTORY OF PRESENT ILLNESS
[FreeTextEntry1] : BPH\par on flomax 0.8\par did not get to fill myrbetriq\par symptoms are persistent

## 2021-10-29 NOTE — ASSESSMENT
[FreeTextEntry1] : BPH\par will start myrbetriq\par continue flomax 0.8\par if cannot get coverage for myrbetriq will start finasteride 5mg\par f/u 2 motnhs\par

## 2022-01-04 ENCOUNTER — APPOINTMENT (OUTPATIENT)
Dept: UROLOGY | Facility: CLINIC | Age: 72
End: 2022-01-04

## 2022-02-14 ENCOUNTER — APPOINTMENT (OUTPATIENT)
Dept: NEUROLOGY | Facility: CLINIC | Age: 72
End: 2022-02-14
Payer: MEDICARE

## 2022-02-14 VITALS
DIASTOLIC BLOOD PRESSURE: 90 MMHG | WEIGHT: 192 LBS | SYSTOLIC BLOOD PRESSURE: 149 MMHG | BODY MASS INDEX: 28.44 KG/M2 | HEART RATE: 86 BPM | TEMPERATURE: 97.9 F | OXYGEN SATURATION: 96 % | HEIGHT: 69 IN

## 2022-02-14 DIAGNOSIS — R56.9 UNSPECIFIED CONVULSIONS: ICD-10-CM

## 2022-02-14 PROCEDURE — 99214 OFFICE O/P EST MOD 30 MIN: CPT

## 2022-02-14 RX ORDER — DONEPEZIL HYDROCHLORIDE 10 MG/1
10 TABLET ORAL
Qty: 30 | Refills: 5 | Status: ACTIVE | COMMUNITY
Start: 2021-08-31 | End: 1900-01-01

## 2022-02-14 RX ORDER — ROSUVASTATIN CALCIUM 40 MG/1
40 TABLET, FILM COATED ORAL
Qty: 90 | Refills: 0 | Status: ACTIVE | COMMUNITY
Start: 2021-12-30

## 2022-02-14 NOTE — PHYSICAL EXAM
[FreeTextEntry1] : General: no apparent distress\par Mental Status: awake and alert, speech fluent and prosodic with no paraphasic errors, remembers 1/3 at 5 minutes, cannot copy Luria sequence\par Cranial Nerves: tracks in all directions, face symmetric, no dysarthria\par Motor: no abnormal movements, no orbiting or pronator drift\par Coordination: no dysmetria on finger-nose-finger testing\par Gait: narrow base, normal stance and stride, no Romberg\par

## 2022-02-14 NOTE — HISTORY OF PRESENT ILLNESS
[FreeTextEntry1] : Presents with wife (in office) and daughter (via phone) for cognitive impairment.  Memory is worsening, does not remember whether or not he had breakfast, when his great-grandchildren came over he could not remember who they were and asked his wife if they were doing a babysitting job, does not remember what day of the week it is.  Continues on donepezil without side effects.\par \par About 3 weeks ago had an episode of staring straight ahead, stiffening, unresponsiveness. Last a few minutes and was confused afterward.  No shaking.  Has never had anything like this before.

## 2022-02-14 NOTE — ASSESSMENT
[FreeTextEntry1] : 1) Dementia with worsening cognitive impairment -- most likely vascular as he has several risk factors and MRI shows chronic infarcts and microvascular disease.  Differential also includes Alzheimer's, less likely CAA as his microhemorrhages are mostly in a subcortical distribution more suggestive of hypertensive etiology (though some are lobar).  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

## 2022-02-14 NOTE — DATA REVIEWED
[de-identified] : MRI brain: multiple chronic infarcts, moderate microvascular ischemic disease, chronic microhemorrhages in a pattern suggestive of hypertensive etiology [de-identified] : Note from Dr. Squires reviewed\par Hypercoagulability labs reviewed

## 2022-02-15 ENCOUNTER — APPOINTMENT (OUTPATIENT)
Dept: UROLOGY | Facility: CLINIC | Age: 72
End: 2022-02-15

## 2022-03-21 ENCOUNTER — APPOINTMENT (OUTPATIENT)
Dept: NEUROLOGY | Facility: CLINIC | Age: 72
End: 2022-03-21
Payer: MEDICARE

## 2022-03-22 ENCOUNTER — APPOINTMENT (OUTPATIENT)
Dept: NEUROLOGY | Facility: CLINIC | Age: 72
End: 2022-03-22

## 2022-03-22 PROCEDURE — 95708 EEG WO VID EA 12-26HR UNMNTR: CPT

## 2022-03-22 PROCEDURE — 95719 EEG PHYS/QHP EA INCR W/O VID: CPT

## 2022-03-22 PROCEDURE — 95700 EEG CONT REC W/VID EEG TECH: CPT

## 2022-08-17 ENCOUNTER — APPOINTMENT (OUTPATIENT)
Dept: NEUROLOGY | Facility: CLINIC | Age: 72
End: 2022-08-17

## 2023-03-06 NOTE — PHYSICAL EXAM
[Abnormal Walk] : normal gait [Musculoskeletal - Swelling] : no joint swelling seen [Motor Tone] : muscle strength and tone were normal [General Appearance - Well Developed] : well developed [Normal Appearance] : normal appearance [Well Groomed] : well groomed [General Appearance - Well Nourished] : well nourished [No Deformities] : no deformities [General Appearance - In No Acute Distress] : no acute distress [Low Lying Soft Palate] : low lying soft palate [III] : III [Heart Rate And Rhythm] : heart rate was normal and rhythm regular [Heart Sounds] : normal S1 and S2 [Heart Sounds Gallop] : no gallops [Murmurs] : no murmurs [Heart Sounds Pericardial Friction Rub] : no pericardial rub [Auscultation Breath Sounds / Voice Sounds] : lungs were clear to auscultation bilaterally [Cyanosis, Localized] : no localized cyanosis [Nail Clubbing] : no clubbing of the fingernails [Petechial Hemorrhages (___cm)] : no petechial hemorrhages [] : no ischemic changes [FreeTextEntry2] : 2+ R LE edema had a stroke last wednesday; d/c from hospital saturday; symptoms getting worse, never went away; left side numbness for over a week